# Patient Record
Sex: FEMALE | Race: WHITE | NOT HISPANIC OR LATINO | URBAN - METROPOLITAN AREA
[De-identification: names, ages, dates, MRNs, and addresses within clinical notes are randomized per-mention and may not be internally consistent; named-entity substitution may affect disease eponyms.]

---

## 2021-04-26 ENCOUNTER — TELEPHONE (OUTPATIENT)
Dept: OBSTETRICS AND GYNECOLOGY | Facility: CLINIC | Age: 36
End: 2021-04-26

## 2023-03-27 ENCOUNTER — OFFICE VISIT (OUTPATIENT)
Dept: PODIATRY | Facility: CLINIC | Age: 38
End: 2023-03-27
Payer: COMMERCIAL

## 2023-03-27 VITALS — DIASTOLIC BLOOD PRESSURE: 61 MMHG | SYSTOLIC BLOOD PRESSURE: 106 MMHG | HEART RATE: 74 BPM | WEIGHT: 145.5 LBS

## 2023-03-27 DIAGNOSIS — M21.622 TAILOR'S BUNION OF BOTH FEET: ICD-10-CM

## 2023-03-27 DIAGNOSIS — M21.621 TAILOR'S BUNION OF BOTH FEET: ICD-10-CM

## 2023-03-27 DIAGNOSIS — M21.619 BUNION: Primary | ICD-10-CM

## 2023-03-27 PROCEDURE — 3074F SYST BP LT 130 MM HG: CPT | Mod: CPTII,S$GLB,, | Performed by: PODIATRIST

## 2023-03-27 PROCEDURE — 99203 OFFICE O/P NEW LOW 30 MIN: CPT | Mod: S$GLB,,, | Performed by: PODIATRIST

## 2023-03-27 PROCEDURE — 3074F PR MOST RECENT SYSTOLIC BLOOD PRESSURE < 130 MM HG: ICD-10-PCS | Mod: CPTII,S$GLB,, | Performed by: PODIATRIST

## 2023-03-27 PROCEDURE — 99999 PR PBB SHADOW E&M-EST. PATIENT-LVL II: ICD-10-PCS | Mod: PBBFAC,,, | Performed by: PODIATRIST

## 2023-03-27 PROCEDURE — 3078F PR MOST RECENT DIASTOLIC BLOOD PRESSURE < 80 MM HG: ICD-10-PCS | Mod: CPTII,S$GLB,, | Performed by: PODIATRIST

## 2023-03-27 PROCEDURE — 99999 PR PBB SHADOW E&M-EST. PATIENT-LVL II: CPT | Mod: PBBFAC,,, | Performed by: PODIATRIST

## 2023-03-27 PROCEDURE — 99203 PR OFFICE/OUTPT VISIT, NEW, LEVL III, 30-44 MIN: ICD-10-PCS | Mod: S$GLB,,, | Performed by: PODIATRIST

## 2023-03-27 PROCEDURE — 3078F DIAST BP <80 MM HG: CPT | Mod: CPTII,S$GLB,, | Performed by: PODIATRIST

## 2023-03-27 RX ORDER — PREDNISONE 20 MG/1
40 TABLET ORAL EVERY MORNING
COMMUNITY
Start: 2022-10-14

## 2023-03-27 RX ORDER — NAPROXEN SODIUM 550 MG/1
550 TABLET ORAL 2 TIMES DAILY
COMMUNITY
Start: 2023-03-24

## 2023-03-27 RX ORDER — CETIRIZINE HYDROCHLORIDE 10 MG/1
10 TABLET ORAL
COMMUNITY
Start: 2023-02-22

## 2023-03-27 RX ORDER — FLUOXETINE 10 MG/1
10 CAPSULE ORAL
COMMUNITY
Start: 2023-03-19

## 2023-03-27 RX ORDER — DOXYCYCLINE 100 MG/1
100 CAPSULE ORAL 2 TIMES DAILY
COMMUNITY
Start: 2022-11-18

## 2023-03-27 RX ORDER — LEVOTHYROXINE SODIUM 50 UG/1
50 TABLET ORAL
COMMUNITY
Start: 2022-07-12 | End: 2023-04-08

## 2023-03-27 RX ORDER — CYCLOBENZAPRINE HCL 5 MG
5 TABLET ORAL
COMMUNITY
Start: 2023-03-20

## 2023-03-27 RX ORDER — FLUCONAZOLE 150 MG/1
150 TABLET ORAL
COMMUNITY
Start: 2022-10-14

## 2023-03-27 RX ORDER — CLONAZEPAM 0.5 MG/1
0.5 TABLET ORAL DAILY PRN
COMMUNITY
Start: 2023-03-23

## 2023-03-27 RX ORDER — PROMETHAZINE HYDROCHLORIDE AND DEXTROMETHORPHAN HYDROBROMIDE 6.25; 15 MG/5ML; MG/5ML
SYRUP ORAL
COMMUNITY
Start: 2023-03-22

## 2023-03-27 RX ORDER — DOXYCYCLINE HYCLATE 100 MG
100 TABLET ORAL 2 TIMES DAILY
COMMUNITY
Start: 2022-10-14

## 2023-03-27 NOTE — PROGRESS NOTES
Subjective:      Patient ID: Elizabeth Perez is a 37 y.o. female.    Chief Complaint: No chief complaint on file.  Pt presents today c/o bunions and tailor's bunions. Pt states tailor's bunions have become more bothersome since giving birth. Pt states feet are mostly painful in closed toe shoes.     Review of Systems   Constitutional: Negative for chills, fever and malaise/fatigue.   HENT:  Negative for hearing loss.    Cardiovascular:  Negative for claudication.   Respiratory:  Negative for shortness of breath.    Skin:  Negative for flushing and rash.   Musculoskeletal:  Negative for joint pain and myalgias.   Neurological:  Negative for loss of balance, numbness, paresthesias and sensory change.   Psychiatric/Behavioral:  Negative for altered mental status.          Objective:      Physical Exam  Vitals reviewed.   Cardiovascular:      Pulses:           Dorsalis pedis pulses are 2+ on the right side and 2+ on the left side.        Posterior tibial pulses are 2+ on the right side and 2+ on the left side.      Comments: No edema noted b/L  Musculoskeletal:      Comments:   1st MPJ exostosis w/ lateral deviation of hallux, non trackbound. No pain w/ ROM to b/L hallux    5th MPJ exostosis w/ medial deviation of 5th metatarsal  No pain w/ ROM b/L pain with palpation of exostosis       Feet:      Right foot:      Protective Sensation: 5 sites tested.  5 sites sensed.      Left foot:      Protective Sensation: 5 sites tested.  5 sites sensed.   Skin:     Comments: Normal skin tugor noted.   No open lesion noted b/L  Skin temp is warm to warm from proximal to distal b/L.  Webspaces clean, dry, and intact     Neurological:      Mental Status: She is alert.      Comments: Gross sensation intact b/L           Assessment:       Encounter Diagnoses   Name Primary?    Bunion Yes    Tailor's bunion of both feet          Plan:       Diagnoses and all orders for this visit:    Bunion    Tailor's bunion of both feet      I  counseled the patient on her conditions, their implications and medical management.    Pt was advised that she has a bunion deformity. Conservative and surgical options discussed with pt to correct and treat bunion deformities. Pt opted for conservative.   Pt instructed to purchase OTC Voltaren gel 1%, use on affected area. Pt advised discontinue usage if any adverse effects should occur.   Shoe modification advised for the pt. Pt was advised to obtain shoes will accommodate foot deformities.   Call or return to clinic prn if these symptoms worsen or fail to improve as anticipated.      .